# Patient Record
Sex: FEMALE | Race: OTHER | HISPANIC OR LATINO | URBAN - METROPOLITAN AREA
[De-identification: names, ages, dates, MRNs, and addresses within clinical notes are randomized per-mention and may not be internally consistent; named-entity substitution may affect disease eponyms.]

---

## 2020-02-04 ENCOUNTER — EMERGENCY (EMERGENCY)
Facility: HOSPITAL | Age: 40
LOS: 0 days | Discharge: HOME | End: 2020-02-04
Attending: EMERGENCY MEDICINE | Admitting: EMERGENCY MEDICINE
Payer: MEDICAID

## 2020-02-04 VITALS
RESPIRATION RATE: 16 BRPM | HEART RATE: 122 BPM | DIASTOLIC BLOOD PRESSURE: 71 MMHG | OXYGEN SATURATION: 100 % | TEMPERATURE: 99 F | SYSTOLIC BLOOD PRESSURE: 145 MMHG

## 2020-02-04 VITALS
RESPIRATION RATE: 16 BRPM | SYSTOLIC BLOOD PRESSURE: 118 MMHG | DIASTOLIC BLOOD PRESSURE: 75 MMHG | HEART RATE: 82 BPM | OXYGEN SATURATION: 100 %

## 2020-02-04 DIAGNOSIS — O02.1 MISSED ABORTION: ICD-10-CM

## 2020-02-04 LAB
ALBUMIN SERPL ELPH-MCNC: 4.6 G/DL — SIGNIFICANT CHANGE UP (ref 3.5–5.2)
ALP SERPL-CCNC: 98 U/L — SIGNIFICANT CHANGE UP (ref 30–115)
ALT FLD-CCNC: 21 U/L — SIGNIFICANT CHANGE UP (ref 0–41)
ANION GAP SERPL CALC-SCNC: 16 MMOL/L — HIGH (ref 7–14)
APPEARANCE UR: CLEAR — SIGNIFICANT CHANGE UP
APTT BLD: 29.6 SEC — SIGNIFICANT CHANGE UP (ref 27–39.2)
AST SERPL-CCNC: 18 U/L — SIGNIFICANT CHANGE UP (ref 0–41)
BACTERIA # UR AUTO: ABNORMAL
BASOPHILS # BLD AUTO: 0.03 K/UL — SIGNIFICANT CHANGE UP (ref 0–0.2)
BASOPHILS NFR BLD AUTO: 0.3 % — SIGNIFICANT CHANGE UP (ref 0–1)
BILIRUB SERPL-MCNC: 0.4 MG/DL — SIGNIFICANT CHANGE UP (ref 0.2–1.2)
BILIRUB UR-MCNC: NEGATIVE — SIGNIFICANT CHANGE UP
BLD GP AB SCN SERPL QL: SIGNIFICANT CHANGE UP
BUN SERPL-MCNC: 7 MG/DL — LOW (ref 10–20)
CALCIUM SERPL-MCNC: 10 MG/DL — SIGNIFICANT CHANGE UP (ref 8.5–10.1)
CHLORIDE SERPL-SCNC: 99 MMOL/L — SIGNIFICANT CHANGE UP (ref 98–110)
CO2 SERPL-SCNC: 21 MMOL/L — SIGNIFICANT CHANGE UP (ref 17–32)
COLOR SPEC: YELLOW — SIGNIFICANT CHANGE UP
CREAT SERPL-MCNC: 0.6 MG/DL — LOW (ref 0.7–1.5)
DIFF PNL FLD: NEGATIVE — SIGNIFICANT CHANGE UP
EOSINOPHIL # BLD AUTO: 0.1 K/UL — SIGNIFICANT CHANGE UP (ref 0–0.7)
EOSINOPHIL NFR BLD AUTO: 0.8 % — SIGNIFICANT CHANGE UP (ref 0–8)
EPI CELLS # UR: 9 /HPF — HIGH (ref 0–5)
GLUCOSE SERPL-MCNC: 156 MG/DL — HIGH (ref 70–99)
GLUCOSE UR QL: ABNORMAL
HCG SERPL-ACNC: HIGH MIU/ML
HCT VFR BLD CALC: 40.2 % — SIGNIFICANT CHANGE UP (ref 37–47)
HGB BLD-MCNC: 13.6 G/DL — SIGNIFICANT CHANGE UP (ref 12–16)
HYALINE CASTS # UR AUTO: 1 /LPF — SIGNIFICANT CHANGE UP (ref 0–7)
IMM GRANULOCYTES NFR BLD AUTO: 0.5 % — HIGH (ref 0.1–0.3)
INR BLD: 1.02 RATIO — SIGNIFICANT CHANGE UP (ref 0.65–1.3)
KETONES UR-MCNC: ABNORMAL
LEUKOCYTE ESTERASE UR-ACNC: NEGATIVE — SIGNIFICANT CHANGE UP
LYMPHOCYTES # BLD AUTO: 2.51 K/UL — SIGNIFICANT CHANGE UP (ref 1.2–3.4)
LYMPHOCYTES # BLD AUTO: 21.2 % — SIGNIFICANT CHANGE UP (ref 20.5–51.1)
MCHC RBC-ENTMCNC: 28.6 PG — SIGNIFICANT CHANGE UP (ref 27–31)
MCHC RBC-ENTMCNC: 33.8 G/DL — SIGNIFICANT CHANGE UP (ref 32–37)
MCV RBC AUTO: 84.5 FL — SIGNIFICANT CHANGE UP (ref 81–99)
MONOCYTES # BLD AUTO: 0.55 K/UL — SIGNIFICANT CHANGE UP (ref 0.1–0.6)
MONOCYTES NFR BLD AUTO: 4.6 % — SIGNIFICANT CHANGE UP (ref 1.7–9.3)
NEUTROPHILS # BLD AUTO: 8.58 K/UL — HIGH (ref 1.4–6.5)
NEUTROPHILS NFR BLD AUTO: 72.6 % — SIGNIFICANT CHANGE UP (ref 42.2–75.2)
NITRITE UR-MCNC: NEGATIVE — SIGNIFICANT CHANGE UP
NRBC # BLD: 0 /100 WBCS — SIGNIFICANT CHANGE UP (ref 0–0)
PH UR: 7 — SIGNIFICANT CHANGE UP (ref 5–8)
PLATELET # BLD AUTO: 387 K/UL — SIGNIFICANT CHANGE UP (ref 130–400)
POTASSIUM SERPL-MCNC: 4 MMOL/L — SIGNIFICANT CHANGE UP (ref 3.5–5)
POTASSIUM SERPL-SCNC: 4 MMOL/L — SIGNIFICANT CHANGE UP (ref 3.5–5)
PROT SERPL-MCNC: 8.3 G/DL — HIGH (ref 6–8)
PROT UR-MCNC: ABNORMAL
PROTHROM AB SERPL-ACNC: 11.7 SEC — SIGNIFICANT CHANGE UP (ref 9.95–12.87)
RBC # BLD: 4.76 M/UL — SIGNIFICANT CHANGE UP (ref 4.2–5.4)
RBC # FLD: 13 % — SIGNIFICANT CHANGE UP (ref 11.5–14.5)
RBC CASTS # UR COMP ASSIST: 1 /HPF — SIGNIFICANT CHANGE UP (ref 0–4)
SODIUM SERPL-SCNC: 136 MMOL/L — SIGNIFICANT CHANGE UP (ref 135–146)
SP GR SPEC: 1.02 — SIGNIFICANT CHANGE UP (ref 1.01–1.02)
UROBILINOGEN FLD QL: SIGNIFICANT CHANGE UP
WBC # BLD: 11.83 K/UL — HIGH (ref 4.8–10.8)
WBC # FLD AUTO: 11.83 K/UL — HIGH (ref 4.8–10.8)
WBC UR QL: 5 /HPF — SIGNIFICANT CHANGE UP (ref 0–5)

## 2020-02-04 PROCEDURE — 76856 US EXAM PELVIC COMPLETE: CPT | Mod: 26

## 2020-02-04 PROCEDURE — 99285 EMERGENCY DEPT VISIT HI MDM: CPT

## 2020-02-04 PROCEDURE — 99283 EMERGENCY DEPT VISIT LOW MDM: CPT

## 2020-02-04 RX ORDER — SODIUM CHLORIDE 9 MG/ML
2000 INJECTION INTRAMUSCULAR; INTRAVENOUS; SUBCUTANEOUS ONCE
Refills: 0 | Status: COMPLETED | OUTPATIENT
Start: 2020-02-04 | End: 2020-02-04

## 2020-02-04 RX ADMIN — SODIUM CHLORIDE 1000 MILLILITER(S): 9 INJECTION INTRAMUSCULAR; INTRAVENOUS; SUBCUTANEOUS at 16:23

## 2020-02-04 NOTE — ED PROVIDER NOTE - PROGRESS NOTE DETAILS
bhcg 35K, pelvic sono - + IUP measuring 7w 3d, no FH.  case d/w gyn, to come and eval. Pt seen and eval by gyn, ok to d/c home, she has appt for d&c eval at [facility] in 3 days, pt given # for gyn clinic in case she wants to f/u here.  told to return to ER for pelvic pain, VB, or any other new/concerning symptoms.  pt understands and agrees with plan.

## 2020-02-04 NOTE — ED ADULT TRIAGE NOTE - CHIEF COMPLAINT QUOTE
As per , "The doctor didn't hear the baby's heartbeat on the ultrasound. They wanted me to come to the hospital." Pt denies pain, vaginal bleeding. LMP 12/3/19.

## 2020-02-04 NOTE — ED PROVIDER NOTE - NSFOLLOWUPINSTRUCTIONS_ED_ALL_ED_FT
-FOLLOW-UP AT [facility] ON FRIDAY AS SCHEDULED.  -RETURN TO ER FOR ABDOMINAL PAIN, VAGINAL BLEEDING, VOMITING, FEVER, OR ANY OTHER NEW OR CONCERNING SYMPTOMS.

## 2020-02-04 NOTE — ED PROVIDER NOTE - NSFOLLOWUPCLINICS_GEN_ALL_ED_FT
[facility]  OB/GYN  [address] [place] [address]  Phone: [phone]  Fax:   Follow Up Time: 1-3 Days

## 2020-02-04 NOTE — ED ADULT NURSE NOTE - OBJECTIVE STATEMENT
Patient is a 39y F/ As per patient the doctor did not hear the babys heartbeat on ultrasound. Pt denies pain or vaginal bleeding. LMP 12/3/19

## 2020-02-04 NOTE — ED PROVIDER NOTE - OBJECTIVE STATEMENT
38 y/o pregnant female , LMP 12/3/19 @ 9w 0d, in ER for eval of ? missed ab.  Pt states had a routine visit today at a gyn clinic, had a sono which showed no FH.  Pt states she had  prior visit 2 weeks ago, had sono then, also didn't see an FH, but was told probably too early and today was f/u visit.  Pt denies any VB.  No abdominal/pelvic pain.  no back pain.  +N.  no v/d.  no f/c.  no urinary symptoms.  no cp/sob.  no ha/dizziness/loc.

## 2020-02-04 NOTE — CONSULT NOTE ADULT - ASSESSMENT
38yo , at 9w0d, with missed , currently clinically and hemodynamically stable.   -f/u at [facility] for D+C  -info given for [facility] for GYN follow up for care if desired  -bleeding precautions given    Dr. Chong aware, Dr. Jeff manning 40yo , at 9w0d, with missed , currently clinically and hemodynamically stable.   -f/u at [facility] for D+C  -info given for [facility] for GYN follow up for care if desired  -bleeding precautions given  -Dispo per ED     Dr. Chong aware, Dr. Aguilar aware

## 2020-02-04 NOTE — CONSULT NOTE ADULT - SUBJECTIVE AND OBJECTIVE BOX
Chief Complaint:    HPI: 38yo , at 9w0d, LMP 2019, with missed . She reports having a missed  diagnosed today by ultrasound with no fHR at [facility] clinic, and presented accordingly to make sure the diagnosis was correct. She denies VB or cramping. Denies CP, SOB, N/V, diarrhea, pain/difficulty with urination, fevers, chills. Planned and desired pregnancy. Has appointment this Friday at 1400 for D+C at [facility].     Ob/Gyn History:  , NSVDX1. Full term no complications. LMP 12/3/2019, normal cycles. Denies history of ovarian cysts, uterine fibroids, abnormal paps, or STIs  Last Pap Smear - 2018    Denies the following: constitutional symptoms, visual symptoms, cardiovascular symptoms, respiratory symptoms, GI symptoms, musculoskeletal symptoms, skin symptoms, neurologic symptoms, hematologic symptoms, allergic symptoms, psychiatric symptoms  Except any pertinent positives listed.     Home Medications: Cholesterol medication   Allergies: No Known Allergies  PAST MEDICAL & SURGICAL HISTORY: Denies   FAMILY HISTORY: Denies   SOCIAL HISTORY: Denies cigarette use, alcohol use, or illicit drug use    Vital Signs Last 24 Hrs  T(F): 98.9 (2020 14:44), Max: 98.9 (2020 14:44)  HR: 97 (2020 19:24) (97 - 122)  BP: 127/73 (2020 19:24) (127/73 - 145/71)  RR: 16 (2020 14:44) (16 - 16)    General Appearance - AAOx3, NAD  Heart - S1S2 regular rate and rhythm  Lung - CTA Bilaterally  Abdomen - Soft, nontender, nondistended, no rebound, no rigidity, no guarding, bowel sounds present    GYN/Pelvis:  Labia Majora - Normal  Labia Minora - Normal  Clitoris - Normal  Urethra - Normal  Vagina - Normal; no blood in vaginal canal   Cervix - Normal, no CMT no bleeding no abnormal discharge     Uterus:  Size - Normal; 6w gravid   Tenderness - None  Mass - None  Freely mobile    Adnexa:  Masses - None  Tenderness - None      Meds:   sodium chloride 0.9% Bolus 2000 milliLiter(s) IV Bolus once    LABS:                        13.6   11.83 )-----------( 387      ( 2020 15:55 )             40.2     HCG Quantitative, Serum: 47314.0 mIU/mL (20 @ 15:55)  ABO RH Interpretation: O POS (20 @ 15:55)  Antibody Screen: NEG (20 @ 15:55)        136  |  99  |  7<L>  ----------------------------<  156<H>  4.0   |  21  |  0.6<L>    Ca    10.0      2020 15:55    TPro  8.3<H>  /  Alb  4.6  /  TBili  0.4  /  DBili  x   /  AST  18  /  ALT  21  /  AlkPhos  98      PT/INR - ( 2020 15:55 )   PT: 11.70 sec;   INR: 1.02 ratio        PTT - ( 2020 15:55 )  PTT:29.6 sec  Urinalysis Basic - ( 2020 15:52 )  Color: Yellow / Appearance: Clear / S.025 / pH: x  Gluc: x / Ketone: Small  / Bili: Negative / Urobili: <2 mg/dL   Blood: x / Protein: 30 mg/dL / Nitrite: Negative   Leuk Esterase: Negative / RBC: 1 /HPF / WBC 5 /HPF   Sq Epi: x / Non Sq Epi: 9 /HPF / Bacteria: Few      RADIOLOGY & ADDITIONAL STUDIES:  < from: US Pelvis Complete (20 @ 18:40) >  UTERUS: Anteverted measuring 9.1 x 6.0 x 5.8 cm, contains a gestational sac and fetal pole.  Mean sac diameter measures 2.7 cm. Crown-rump length measures 1.3 cm corresponding to an estimated gestational age of 7 weeks and 3 days. No fetal heart rateat this time.  RIGHT OVARY: measures 2.9 x 2.5 x 2.7 cm, and contains a corpus luteal cyst measuring 1.8 x 1.5 cm. Doppler flow is demonstrated to the right ovary.   LEFT OVARY: Poorly visualized.  OTHER: No free fluid in the pelvis.  IMPRESSION:  Intrauterine pregnancy with estimated gestational age of 7 weeks and 3 days. No fetal heart rate at this time, which may be pathologic. Recommend a short-term follow-up pelvic ultrasound and serial beta-hCG to document fetal heart rate.  < end of copied text > Chief Complaint:    HPI: 38yo , at 9w0d, LMP 2019, with missed . She reports having a missed  diagnosed today by ultrasound with no fHR at [facility] clinic, and presented accordingly to make sure the diagnosis was correct. She denies VB or cramping. Denies CP, SOB, N/V, diarrhea, pain/difficulty with urination, fevers, chills. Planned and desired pregnancy. Has appointment this Friday at 1400 for D+C at [facility].     Ob/Gyn History:  , NSVDX1. Full term no complications. LMP 12/3/2019, normal cycles. Denies history of ovarian cysts, uterine fibroids, abnormal paps, or STIs  Last Pap Smear - 2018    Denies the following: constitutional symptoms, visual symptoms, cardiovascular symptoms, respiratory symptoms, GI symptoms, musculoskeletal symptoms, skin symptoms, neurologic symptoms, hematologic symptoms, allergic symptoms, psychiatric symptoms  Except any pertinent positives listed.     Home Medications: Cholesterol medication   Allergies: No Known Allergies  PAST MEDICAL & SURGICAL HISTORY: Denies   FAMILY HISTORY: Denies   SOCIAL HISTORY: Denies cigarette use, alcohol use, or illicit drug use    Vital Signs Last 24 Hrs  T(F): 98.9 (2020 14:44), Max: 98.9 (2020 14:44)  HR: 97 (2020 19:24) (97 - 122)  BP: 127/73 (2020 19:24) (127/73 - 145/71)  RR: 16 (2020 14:44) (16 - 16)    General Appearance - AAOx3, NAD  Heart - S1S2 regular rate and rhythm  Lung - CTA Bilaterally  Abdomen - Soft, nontender, nondistended, no rebound, no rigidity, no guarding, bowel sounds present    GYN/Pelvis:  Labia Majora - Normal  Labia Minora - Normal  Clitoris - Normal  Urethra - Normal  Vagina - Normal; no blood in vaginal canal   Cervix - Normal, no CMT no bleeding no abnormal discharge, closed.     Uterus:  Size - Normal; 6w gravid   Tenderness - None  Mass - None  Freely mobile    Adnexa:  Masses - None  Tenderness - None      Meds:   sodium chloride 0.9% Bolus 2000 milliLiter(s) IV Bolus once    LABS:                        13.6   11.83 )-----------( 387      ( 2020 15:55 )             40.2     HCG Quantitative, Serum: 18807.0 mIU/mL (20 @ 15:55)  ABO RH Interpretation: O POS (20 @ 15:55)  Antibody Screen: NEG (20 @ 15:55)        136  |  99  |  7<L>  ----------------------------<  156<H>  4.0   |  21  |  0.6<L>    Ca    10.0      2020 15:55    TPro  8.3<H>  /  Alb  4.6  /  TBili  0.4  /  DBili  x   /  AST  18  /  ALT  21  /  AlkPhos  98      PT/INR - ( 2020 15:55 )   PT: 11.70 sec;   INR: 1.02 ratio        PTT - ( 2020 15:55 )  PTT:29.6 sec  Urinalysis Basic - ( 2020 15:52 )  Color: Yellow / Appearance: Clear / S.025 / pH: x  Gluc: x / Ketone: Small  / Bili: Negative / Urobili: <2 mg/dL   Blood: x / Protein: 30 mg/dL / Nitrite: Negative   Leuk Esterase: Negative / RBC: 1 /HPF / WBC 5 /HPF   Sq Epi: x / Non Sq Epi: 9 /HPF / Bacteria: Few      RADIOLOGY & ADDITIONAL STUDIES:  < from: US Pelvis Complete (20 @ 18:40) >  UTERUS: Anteverted measuring 9.1 x 6.0 x 5.8 cm, contains a gestational sac and fetal pole.  Mean sac diameter measures 2.7 cm. Crown-rump length measures 1.3 cm corresponding to an estimated gestational age of 7 weeks and 3 days. No fetal heart rateat this time.  RIGHT OVARY: measures 2.9 x 2.5 x 2.7 cm, and contains a corpus luteal cyst measuring 1.8 x 1.5 cm. Doppler flow is demonstrated to the right ovary.   LEFT OVARY: Poorly visualized.  OTHER: No free fluid in the pelvis.  IMPRESSION:  Intrauterine pregnancy with estimated gestational age of 7 weeks and 3 days. No fetal heart rate at this time, which may be pathologic. Recommend a short-term follow-up pelvic ultrasound and serial beta-hCG to document fetal heart rate.  < end of copied text >

## 2020-02-04 NOTE — ED PROVIDER NOTE - PATIENT PORTAL LINK FT
You can access the FollowMyHealth Patient Portal offered by [facility] by registering at the following website: [email or web address]. By joining [facility]’s FollowMyHealth portal, you will also be able to view your health information using other applications (apps) compatible with our system.

## 2020-02-13 ENCOUNTER — OUTPATIENT (OUTPATIENT)
Dept: OUTPATIENT SERVICES | Facility: HOSPITAL | Age: 40
LOS: 1 days | Discharge: HOME | End: 2020-02-13

## 2020-02-13 ENCOUNTER — APPOINTMENT (OUTPATIENT)
Dept: OBGYN | Facility: CLINIC | Age: 40
End: 2020-02-13
Payer: MEDICAID

## 2020-02-13 VITALS
BODY MASS INDEX: 25.27 KG/M2 | SYSTOLIC BLOOD PRESSURE: 140 MMHG | DIASTOLIC BLOOD PRESSURE: 82 MMHG | HEIGHT: 64 IN | WEIGHT: 148.05 LBS

## 2020-02-13 PROCEDURE — 99213 OFFICE O/P EST LOW 20 MIN: CPT

## 2020-02-13 NOTE — END OF VISIT
[FreeTextEntry3] : Patient with missed , desiring medical management. Misoprostol prescribed. Follow up in 2 weeks. Bleeding precautions discussed [] : Resident 15-Nov-2019 00:45

## 2020-02-13 NOTE — PHYSICAL EXAM
[Normal] : uterus [No Bleeding] : there was no active vaginal bleeding [Uterine Adnexae] : were not tender and not enlarged [FreeTextEntry5] : CLOSED

## 2020-02-14 DIAGNOSIS — O02.1 MISSED ABORTION: ICD-10-CM

## 2020-02-14 RX ORDER — MISOPROSTOL 200 UG/1
200 TABLET ORAL
Qty: 12 | Refills: 0 | Status: ACTIVE | COMMUNITY
Start: 2020-02-13 | End: 1900-01-01

## 2020-02-27 ENCOUNTER — APPOINTMENT (OUTPATIENT)
Dept: OBGYN | Facility: CLINIC | Age: 40
End: 2020-02-27
Payer: MEDICAID

## 2020-02-27 ENCOUNTER — OUTPATIENT (OUTPATIENT)
Dept: OUTPATIENT SERVICES | Facility: HOSPITAL | Age: 40
LOS: 1 days | Discharge: HOME | End: 2020-02-27

## 2020-02-27 VITALS
BODY MASS INDEX: 25.1 KG/M2 | DIASTOLIC BLOOD PRESSURE: 82 MMHG | WEIGHT: 147 LBS | SYSTOLIC BLOOD PRESSURE: 124 MMHG | HEIGHT: 64 IN

## 2020-02-27 PROCEDURE — 99213 OFFICE O/P EST LOW 20 MIN: CPT

## 2020-02-27 RX ORDER — PNV NO.95/FERROUS FUM/FOLIC AC 28MG-0.8MG
27-0.8 TABLET ORAL DAILY
Qty: 30 | Refills: 3 | Status: ACTIVE | COMMUNITY
Start: 2020-02-27 | End: 1900-01-01

## 2020-02-27 NOTE — COUNSELING
[Nutrition] : nutrition [Breast Self Exam] : breast self exam [Exercise] : exercise [STD (testing, results, tx)] : STD (testing, results, tx) [Contraception] : contraception [Emergency Contraception] : emergency contraception [Lab Results] : lab results [Safe Sexual Practices] : safe sexual practices [Pregnancy Options] : pregnancy options

## 2020-02-27 NOTE — END OF VISIT
[] : Resident [FreeTextEntry3] : Patient here for fertility workup. Labs sent and sonogram ordered. RTC for results.

## 2020-02-27 NOTE — HISTORY OF PRESENT ILLNESS
[Reproductive Age] : is of reproductive age [Regular Exercise] : regular exercise [Menstrual Problems] : reports normal menses [Contraception] : does not use contraception

## 2020-02-28 DIAGNOSIS — O02.1 MISSED ABORTION: ICD-10-CM

## 2020-05-02 NOTE — ED ADULT NURSE NOTE - NSIMPLEMENTINTERV_GEN_ALL_ED
Implemented All Universal Safety Interventions:  [place] to call system. Call bell, personal items and telephone within reach. Instruct patient to call for assistance. Room bathroom lighting operational. Non-slip footwear when patient is off stretcher. Physically safe environment: no spills, clutter or unnecessary equipment. Stretcher in lowest position, wheels locked, appropriate side rails in place.
Patient asked questions/Verbalized Understanding

## 2020-08-31 ENCOUNTER — LABORATORY RESULT (OUTPATIENT)
Age: 40
End: 2020-08-31

## 2020-09-01 ENCOUNTER — OUTPATIENT (OUTPATIENT)
Dept: OUTPATIENT SERVICES | Facility: HOSPITAL | Age: 40
LOS: 1 days | Discharge: HOME | End: 2020-09-01

## 2020-09-01 ENCOUNTER — APPOINTMENT (OUTPATIENT)
Dept: OBGYN | Facility: CLINIC | Age: 40
End: 2020-09-01
Payer: MEDICAID

## 2020-09-01 VITALS
HEIGHT: 64 IN | WEIGHT: 147.44 LBS | DIASTOLIC BLOOD PRESSURE: 80 MMHG | SYSTOLIC BLOOD PRESSURE: 120 MMHG | BODY MASS INDEX: 25.17 KG/M2

## 2020-09-01 DIAGNOSIS — Z00.00 ENCOUNTER FOR GENERAL ADULT MEDICAL EXAMINATION W/OUT ABNORMAL FINDINGS: ICD-10-CM

## 2020-09-01 DIAGNOSIS — O02.1 MISSED ABORTION: ICD-10-CM

## 2020-09-01 DIAGNOSIS — N97.9 FEMALE INFERTILITY, UNSPECIFIED: ICD-10-CM

## 2020-09-01 DIAGNOSIS — Z00.00 ENCOUNTER FOR GENERAL ADULT MEDICAL EXAMINATION WITHOUT ABNORMAL FINDINGS: ICD-10-CM

## 2020-09-01 PROCEDURE — 99395 PREV VISIT EST AGE 18-39: CPT

## 2020-09-01 PROCEDURE — 99212 OFFICE O/P EST SF 10 MIN: CPT | Mod: 25

## 2020-09-01 NOTE — PHYSICAL EXAM
[Awake] : awake [Alert] : alert [Soft] : soft [Oriented x3] : oriented to person, place, and time [Normal] : uterus [Uterine Adnexae] : were not tender and not enlarged [Acute Distress] : no acute distress [Mass] : no breast mass [Nipple Discharge] : no nipple discharge [Axillary LAD] : no axillary lymphadenopathy [Tender] : non tender [No Bleeding] : there was no active vaginal bleeding

## 2020-09-01 NOTE — HISTORY OF PRESENT ILLNESS
[Fair] : being in fair health [Last Pap ___] : Last cervical pap smear was [unfilled] [Reproductive Age] : is of reproductive age [Definite:  ___ (Date)] : the last menstrual period was [unfilled] [Normal Amount/Duration] : was of a normal amount and duration [Regular Cycle Intervals] : periods have been regular [Frequency: Q ___ days] : menstrual periods occur approximately every [unfilled] days [Menstrual Cramps] : menstrual cramps [Sexually Active] : is sexually active [Monogamous] : is monogamous [Spotting Between  Menses] : no spotting between menses

## 2020-09-08 ENCOUNTER — RESULT CHARGE (OUTPATIENT)
Age: 40
End: 2020-09-08

## 2020-09-08 ENCOUNTER — OUTPATIENT (OUTPATIENT)
Dept: OUTPATIENT SERVICES | Facility: HOSPITAL | Age: 40
LOS: 1 days | Discharge: HOME | End: 2020-09-08

## 2020-09-08 ENCOUNTER — APPOINTMENT (OUTPATIENT)
Dept: OBGYN | Facility: CLINIC | Age: 40
End: 2020-09-08
Payer: MEDICAID

## 2020-09-08 VITALS — DIASTOLIC BLOOD PRESSURE: 90 MMHG | BODY MASS INDEX: 24.89 KG/M2 | SYSTOLIC BLOOD PRESSURE: 150 MMHG | WEIGHT: 145 LBS

## 2020-09-08 DIAGNOSIS — R87.810 CERVICAL HIGH RISK HUMAN PAPILLOMAVIRUS (HPV) DNA TEST POSITIVE: ICD-10-CM

## 2020-09-08 LAB
A VAGINAE DNA VAG QL NAA+PROBE: NORMAL
BVAB2 DNA VAG QL NAA+PROBE: NORMAL
C KRUSEI DNA VAG QL NAA+PROBE: NEGATIVE
C TRACH RRNA SPEC QL NAA+PROBE: NEGATIVE
HCG UR QL: NEGATIVE
HPV HIGH+LOW RISK DNA PNL CVX: DETECTED
MEGA1 DNA VAG QL NAA+PROBE: ABNORMAL
N GONORRHOEA RRNA SPEC QL NAA+PROBE: NEGATIVE
QUALITY CONTROL: YES
T VAGINALIS RRNA SPEC QL NAA+PROBE: NEGATIVE

## 2020-09-08 PROCEDURE — 57454 BX/CURETT OF CERVIX W/SCOPE: CPT

## 2020-09-08 NOTE — PROCEDURE
[HPV high risk] : PCR positive for high risk HPV [Patient] : patient [Risks] : risks [Alternatives] : alternatives [Benefits] : benefits [Infection] : infection [Allergic Reaction] : allergic reaction [Bleeding] : bleeding [Consent Obtained] : written consent was obtained prior to the procedure [SCJ Fully Visulized] : the squamocolumnar junction was fully visualized [Pap Performed] : a cervical Pap smear was not performed [Acetowhite ___ o'clock] : ascetowhite changes at [unfilled] ~Uo'clock [Punctation ___ o'clock] : no punctation [Mosaicism ___ o'clock] : no mosaicism [Atypical Vessels ___ o'clock] : no atypical vessels [Normal Staining] : normal staining [No Abnormalities] : no abnormalities seen [Biopsy Locations ___ o'clock] : the biopsies were taken at [unfilled] o'clock [ECC Done] : Endocervical curettage was performed.  [Biopsies Taken: # ___] : [unfilled] biopsies taken of the cervix [Isabella's] : Isabella's solution [Direct Pressure] : direct pressure [No Complications] : there were no complications [Tolerated Well] : the patient tolerated the procedure well

## 2020-09-23 ENCOUNTER — APPOINTMENT (OUTPATIENT)
Dept: OBGYN | Facility: CLINIC | Age: 40
End: 2020-09-23
Payer: MEDICAID

## 2020-09-23 ENCOUNTER — OUTPATIENT (OUTPATIENT)
Dept: OUTPATIENT SERVICES | Facility: HOSPITAL | Age: 40
LOS: 1 days | Discharge: HOME | End: 2020-09-23

## 2020-09-23 VITALS
BODY MASS INDEX: 23.9 KG/M2 | HEIGHT: 64 IN | SYSTOLIC BLOOD PRESSURE: 150 MMHG | DIASTOLIC BLOOD PRESSURE: 82 MMHG | WEIGHT: 140 LBS

## 2020-09-23 DIAGNOSIS — R87.810 CERVICAL HIGH RISK HUMAN PAPILLOMAVIRUS (HPV) DNA TEST POSITIVE: ICD-10-CM

## 2020-09-23 PROCEDURE — 99213 OFFICE O/P EST LOW 20 MIN: CPT

## 2020-09-23 NOTE — HISTORY OF PRESENT ILLNESS
[FreeTextEntry1] : 39 y/o female with +hpv and here for discussion after colpo\par \par bx +hpv\par ecc no dysplasia\par \par  used\par discussed hpv\par diagrams drawn\par virus and eleuterio discussed\par supression, progression discussed\par recommend 1 yr cotesting\par all questions answered

## 2020-10-13 NOTE — ED PROVIDER NOTE - CONDITION AT DISCHARGE:
Additional Notes: Patient consent was obtained to proceed with the visit and recommended plan of care after discussion of all risks and benefits, including the risks of COVID-19 exposure. Detail Level: Simple Satisfactory

## 2021-09-15 ENCOUNTER — APPOINTMENT (OUTPATIENT)
Dept: OBGYN | Facility: CLINIC | Age: 41
End: 2021-09-15